# Patient Record
Sex: FEMALE | Race: WHITE | Employment: OTHER | URBAN - METROPOLITAN AREA
[De-identification: names, ages, dates, MRNs, and addresses within clinical notes are randomized per-mention and may not be internally consistent; named-entity substitution may affect disease eponyms.]

---

## 2020-11-25 ENCOUNTER — TRANSCRIBE ORDER (OUTPATIENT)
Dept: SCHEDULING | Age: 75
End: 2020-11-25

## 2020-11-25 DIAGNOSIS — M54.42 ACUTE BACK PAIN WITH SCIATICA, LEFT: ICD-10-CM

## 2020-11-25 DIAGNOSIS — M54.41 ACUTE BACK PAIN WITH SCIATICA, RIGHT: Primary | ICD-10-CM

## 2020-12-08 ENCOUNTER — HOSPITAL ENCOUNTER (OUTPATIENT)
Dept: MRI IMAGING | Age: 75
Discharge: HOME | End: 2020-12-08
Attending: FAMILY MEDICINE
Payer: MEDICARE

## 2020-12-08 DIAGNOSIS — M54.41 ACUTE BACK PAIN WITH SCIATICA, RIGHT: ICD-10-CM

## 2020-12-08 DIAGNOSIS — M54.42 ACUTE BACK PAIN WITH SCIATICA, LEFT: ICD-10-CM

## 2020-12-08 PROCEDURE — 72148 MRI LUMBAR SPINE W/O DYE: CPT

## 2021-11-05 ENCOUNTER — TELEPHONE (OUTPATIENT)
Dept: NEUROLOGY | Age: 76
End: 2021-11-05

## 2021-11-05 NOTE — TELEPHONE ENCOUNTER
----- Message from Lulu Beatty sent at 11/5/2021  9:52 AM EDT -----  Regarding: \telephone  Contact: [phone]  General Message/Vendor Calls    Caller's first and last name:self      Reason for call:just have some question about her appointment       Callback required yes/no and why:y      Best contact number(s):[phone]      Details to clarify the request:n\a      Lulu Beatty

## 2021-11-12 ENCOUNTER — OFFICE VISIT (OUTPATIENT)
Dept: NEUROLOGY | Age: 76
End: 2021-11-12
Payer: MEDICARE

## 2021-11-12 DIAGNOSIS — R20.2 PARESTHESIA: Primary | ICD-10-CM

## 2021-11-12 PROCEDURE — 95886 MUSC TEST DONE W/N TEST COMP: CPT | Performed by: PSYCHIATRY & NEUROLOGY

## 2021-11-12 PROCEDURE — 95910 NRV CNDJ TEST 7-8 STUDIES: CPT | Performed by: PSYCHIATRY & NEUROLOGY

## 2021-11-12 NOTE — LETTER
2021 4:06 PM    Patient:  Katya Curry   YOB: 1945  Date of Visit: 2021      Dear Bryanna Maurer, [address] [place] [address]  Via Fax: [phone]: Thank you for referring Ms. Katya Curry to me for EMG/NCS. EMG/ NCS Report  [facility] - INPATIENT  [place] [address] [place], [address] [place], [address]   Ph: [phone]   FAX: [phone]/ [phone]  Test Date:  2019      Test Date:  2021    Patient: Katya Curry : 1945 Physician: Natanael Orona MD   Sex: Female Height: ' \" Ref Phys: Abilio Valerio MD   ID#:  [id number] Weight:  lbs. Technician: Rodrigo Cespedes     Patient History / Exam:    Patient comes in with intermittent numbness of both feet. (+) chronic lower back pain seeing pain management Dr Xavier Tinoco. Patient is coming for radiculopathy evaluation            EMG & NCV Findings:  Evaluation of the left Fibular motor and the right Fibular motor nerves showed normal distal onset latency (L4.1, R5.1 ms), normal amplitude (L4.4, R1.2 mV), normal conduction velocity (B Fib-Ankle, L44, R41 m/s), and normal conduction velocity (Poplt-B Fib, L48, R45 m/s). The left tibial motor and the right tibial motor nerves showed normal distal onset latency (L4.4, R4.4 ms), normal amplitude (L9.8, R6.4 mV), and normal conduction velocity (Knee-Ankle, L40, R39 m/s). The left Sup Fibular sensory and the right Sup Fibular sensory nerves showed normal distal peak latency (L2.8, R2.5 ms), normal amplitude (L5.5, R4.0 µV), and normal conduction velocity (Lower leg-Lat ankle, L42, R50 m/s). The left sural sensory and the right sural sensory nerves showed normal distal peak latency (L3.9, R3.6 ms) and normal amplitude (L5.9, R4.0 µV). All F Wave latencies were within normal limits. All examined muscles (as indicated in the following table) showed no evidence of electrical instability. Impression:    Extensive electrodiagnostic examination of the left and right lower extremities is normal.    Specifically, there is no evidence of a peripheral neuropathy or lumbosacral motor radiculopathy.         Judith Dillon MD  Diplomate, American Board of Psychiatry and Neurology  Diplomate, Neuromuscular Medicine  Diplomate, American Board of Electrodiagnostic Medicine  Director, [address] [place] [address] Accredited Laboratory with Exemplary Status          Nerve Conduction Studies  Anti Sensory Summary Table     Stim Site NR Peak (ms) Norm Peak (ms) P-T Amp (µV) Norm P-T Amp Site1 Site2 Dist (cm)   Left Sup Fibular Anti Sensory (Lat ankle)  30.2°C   Lower leg    2.8 <4.6 5.5 >4 Lower leg Lat ankle 10.0   Right Sup Fibular Anti Sensory (Lat ankle)  30°C   Lower leg    2.5 <4.6 4.0 >4 Lower leg Lat ankle 10.0   Left Sural Anti Sensory (Lat Mall)  30.2°C   Calf    3.9 <4.5 5.9 >4.0 Calf Lat Mall 14.0   Right Sural Anti Sensory (Lat Mall)  31.6°C   Calf    3.6 <4.5 4.0 >4.0 Calf Lat Mall 14.0     Motor Summary Table     Stim Site NR Onset (ms) Norm Onset (ms) O-P Amp (mV) Norm O-P Amp Amp (Prev) (%) Site1 Site2 Dist (cm) Domenic (m/s) Norm Domenic (m/s)   Left Fibular Motor (Ext Dig Brev)  33.2°C   Ankle    4.1 <6.5 4.4 >1.1 100.0 Ankle Ext Dig Brev 8.0     B Fib    11.4  4.3  97.7 B Fib Ankle 32.0 44 >38   Poplt    13.5  4.1  95.3 Poplt B Fib 10.0 48 >42   Right Fibular Motor (Ext Dig Brev)  30.4°C   Ankle    5.1 <6.5 1.2 >1.1 100.0 Ankle Ext Dig Brev 8.0     B Fib    13.3  1.2  100.0 B Fib Ankle 34.0 41 >38   Poplt    15.5  1.2  100.0 Poplt B Fib 10.0 45 >42   Left Tibial Motor (Abd Garcia Brev)  30.8°C   Ankle    4.4 <6.1 9.8 >1.1 100.0 Ankle Abd Garcia Brev 8.0     Knee    14.1  5.5  56.1 Knee Ankle 39.0 40 >39   Right Tibial Motor (Abd Garcia Brev)  30.5°C   Ankle    4.4 <6.1 6.4 >1.1 100.0 Ankle Abd Garcia Brev 8.0     Knee    14.5  6.1  95.3 Knee Ankle 39.0 39 >39     F Wave Studies     NR F-Lat (ms) Lat Norm (ms) L-R F-Lat (ms) L-R Lat Norm   Right Tibial (Mrkrs) (Abd Hallucis)  30.2°C      52.54 <56  <5.7     EMG     Side Muscle Nerve Root Ins Act Fibs Psw Recrt Duration Amp Poly Comment   Right Ext Dig Brev Dp Br Peron L5, S1 Nml Nml Nml Nml Nml Nml Nml    Right AbdHallucis MedPlantar S1-2 Nml Nml Nml Nml Nml Nml Nml    Right AntTibialis Dp Br Peron L4-5 Nml Nml Nml Nml Nml Nml Nml    Right MedGastroc Tibial S1-2 Nml Nml Nml Nml Nml Nml Nml    Right VastusLat Femoral L2-4 Nml Nml Nml Nml Nml Nml Nml    Left Ext Dig Brev Dp Br Peron L5, S1 Nml Nml Nml Nml Nml Nml Nml    Left AbdHallucis MedPlantar S1-2 Nml Nml Nml Nml Nml Nml Nml    Left AntTibialis Dp Br Peron L4-5 Nml Nml Nml Nml Nml Nml Nml    Left MedGastroc Tibial S1-2 Nml Nml Nml Nml Nml Nml Nml    Left VastusLat Femoral L2-4 Nml Nml Nml Nml Nml Nml Nml    Left GluteusMed SupGluteal L4-S1 Nml Nml Nml Nml Nml Nml Nml    Left Lower Lumb Parasp Rami L5,S1 Nml Nml Nml Nml Nml Nml Nml                Nerve Conduction Studies  Anti Sensory Left/Right Comparison     Stim Site L Lat (ms) R Lat (ms) L-R Lat (ms) L Amp (µV) R Amp (µV) L-R Amp (%) Site1 Site2 L Domenic (m/s) R Domenic (m/s) L-R Domenic (m/s)   Sup Fibular Anti Sensory (Lat ankle)  30.2°C   Lower leg 2.4 2.0 0.4 5.5 4.0 27.3 Lower leg Lat ankle 42 50 8   Sural Anti Sensory (Lat Mall)  30.2°C   Calf 3.4 3.1 0.3 5.9 4.0 32.2 Calf Lat Mall 41 45 4     Motor Left/Right Comparison     Stim Site L Lat (ms) R Lat (ms) L-R Lat (ms) L Amp (mV) R Amp (mV) L-R Amp (%) Site1 Site2 L Domenic (m/s) R Domenic (m/s) L-R Domenic (m/s)   Fibular Motor (Ext Dig Brev)  33.2°C   Ankle 4.1 5.1 1.0 4.4 1.2 72.7 Ankle Ext Dig Brev      B Fib 11.4 13.3 1.9 4.3 1.2 72.1 B Fib Ankle 44 41 3   Poplt 13.5 15.5 2.0 4.1 1.2 70.7 Poplt B Fib 48 45 3   Tibial Motor (Abd Garcia Brev)  30.8°C   Ankle 4.4 4.4 0.0 9.8 6.4 34.7 Ankle Abd Garcia Brev      Knee 14.1 14.5 0.4 5.5 6.1 9.8 Knee Ankle 40 39 1         Waveforms:

## 2021-11-12 NOTE — PROCEDURES
EMG/ NCS Report  [facility] - INPATIENT  [address] [place], [address] [place], [address]   Ph: [phone]   FAX: [phone]  Test Date:  2019      Test Date:  2021    Patient: Cloivs Romero : 1945 Physician: Sophie Degroot MD   Sex: Female Height: ' \" Ref Phys: Etta Lugo MD   ID#:  [id number] Weight:  lbs. Technician: Yuly Mosley     Patient History / Exam:    Patient comes in with intermittent numbness of both feet. (+) chronic lower back pain seeing pain management Dr Shabbir Hayden. Patient is coming for radiculopathy evaluation            EMG & NCV Findings:  Evaluation of the left Fibular motor and the right Fibular motor nerves showed normal distal onset latency (L4.1, R5.1 ms), normal amplitude (L4.4, R1.2 mV), normal conduction velocity (B Fib-Ankle, L44, R41 m/s), and normal conduction velocity (Poplt-B Fib, L48, R45 m/s). The left tibial motor and the right tibial motor nerves showed normal distal onset latency (L4.4, R4.4 ms), normal amplitude (L9.8, R6.4 mV), and normal conduction velocity (Knee-Ankle, L40, R39 m/s). The left Sup Fibular sensory and the right Sup Fibular sensory nerves showed normal distal peak latency (L2.8, R2.5 ms), normal amplitude (L5.5, R4.0 µV), and normal conduction velocity (Lower leg-Lat ankle, L42, R50 m/s). The left sural sensory and the right sural sensory nerves showed normal distal peak latency (L3.9, R3.6 ms) and normal amplitude (L5.9, R4.0 µV). All F Wave latencies were within normal limits. All examined muscles (as indicated in the following table) showed no evidence of electrical instability. Impression:    Extensive electrodiagnostic examination of the left and right lower extremities is normal.    Specifically, there is no evidence of a peripheral neuropathy or lumbosacral motor radiculopathy.         Sophie Degroot MD  Diplomate, American Board of Psychiatry and Neurology  Diplomate, Neuromuscular Medicine  Diplomate, American Board of Electrodiagnostic Medicine  Director, [address] [place] [address] Accredited Laboratory with Exemplary Status          Nerve Conduction Studies  Anti Sensory Summary Table     Stim Site NR Peak (ms) Norm Peak (ms) P-T Amp (µV) Norm P-T Amp Site1 Site2 Dist (cm)   Left Sup Fibular Anti Sensory (Lat ankle)  30.2°C   Lower leg    2.8 <4.6 5.5 >4 Lower leg Lat ankle 10.0   Right Sup Fibular Anti Sensory (Lat ankle)  30°C   Lower leg    2.5 <4.6 4.0 >4 Lower leg Lat ankle 10.0   Left Sural Anti Sensory (Lat Mall)  30.2°C   Calf    3.9 <4.5 5.9 >4.0 Calf Lat Mall 14.0   Right Sural Anti Sensory (Lat Mall)  31.6°C   Calf    3.6 <4.5 4.0 >4.0 Calf Lat Mall 14.0     Motor Summary Table     Stim Site NR Onset (ms) Norm Onset (ms) O-P Amp (mV) Norm O-P Amp Amp (Prev) (%) Site1 Site2 Dist (cm) Domenic (m/s) Norm Domenic (m/s)   Left Fibular Motor (Ext Dig Brev)  33.2°C   Ankle    4.1 <6.5 4.4 >1.1 100.0 Ankle Ext Dig Brev 8.0     B Fib    11.4  4.3  97.7 B Fib Ankle 32.0 44 >38   Poplt    13.5  4.1  95.3 Poplt B Fib 10.0 48 >42   Right Fibular Motor (Ext Dig Brev)  30.4°C   Ankle    5.1 <6.5 1.2 >1.1 100.0 Ankle Ext Dig Brev 8.0     B Fib    13.3  1.2  100.0 B Fib Ankle 34.0 41 >38   Poplt    15.5  1.2  100.0 Poplt B Fib 10.0 45 >42   Left Tibial Motor (Abd Garcia Brev)  30.8°C   Ankle    4.4 <6.1 9.8 >1.1 100.0 Ankle Abd Garcia Brev 8.0     Knee    14.1  5.5  56.1 Knee Ankle 39.0 40 >39   Right Tibial Motor (Abd Garcia Brev)  30.5°C   Ankle    4.4 <6.1 6.4 >1.1 100.0 Ankle Abd Garcia Brev 8.0     Knee    14.5  6.1  95.3 Knee Ankle 39.0 39 >39     F Wave Studies     NR F-Lat (ms) Lat Norm (ms) L-R F-Lat (ms) L-R Lat Norm   Right Tibial (Mrkrs) (Abd Hallucis)  30.2°C      52.54 <56  <5.7     EMG     Side Muscle Nerve Root Ins Act Fibs Psw Recrt Duration Amp Poly Comment   Right Ext Dig Brev Dp Br Peron L5, S1 Nml Nml Nml Nml Nml Nml Nml    Right AbdHallucis MedPlantar S1-2 Nml Nml Nml Nml Nml Nml Nml    Right AntTibialis Dp Br Peron L4-5 Nml Nml Nml Nml Nml Nml Nml    Right MedGastroc Tibial S1-2 Nml Nml Nml Nml Nml Nml Nml    Right VastusLat Femoral L2-4 Nml Nml Nml Nml Nml Nml Nml    Left Ext Dig Brev Dp Br Peron L5, S1 Nml Nml Nml Nml Nml Nml Nml    Left AbdHallucis MedPlantar S1-2 Nml Nml Nml Nml Nml Nml Nml    Left AntTibialis Dp Br Peron L4-5 Nml Nml Nml Nml Nml Nml Nml    Left MedGastroc Tibial S1-2 Nml Nml Nml Nml Nml Nml Nml    Left VastusLat Femoral L2-4 Nml Nml Nml Nml Nml Nml Nml    Left GluteusMed SupGluteal L4-S1 Nml Nml Nml Nml Nml Nml Nml    Left Lower Lumb Parasp Rami L5,S1 Nml Nml Nml Nml Nml Nml Nml                Nerve Conduction Studies  Anti Sensory Left/Right Comparison     Stim Site L Lat (ms) R Lat (ms) L-R Lat (ms) L Amp (µV) R Amp (µV) L-R Amp (%) Site1 Site2 L Domenic (m/s) R Domenic (m/s) L-R Domenic (m/s)   Sup Fibular Anti Sensory (Lat ankle)  30.2°C   Lower leg 2.4 2.0 0.4 5.5 4.0 27.3 Lower leg Lat ankle 42 50 8   Sural Anti Sensory (Lat Mall)  30.2°C   Calf 3.4 3.1 0.3 5.9 4.0 32.2 Calf Lat Mall 41 45 4     Motor Left/Right Comparison     Stim Site L Lat (ms) R Lat (ms) L-R Lat (ms) L Amp (mV) R Amp (mV) L-R Amp (%) Site1 Site2 L Domenic (m/s) R Domenic (m/s) L-R Domenic (m/s)   Fibular Motor (Ext Dig Brev)  33.2°C   Ankle 4.1 5.1 1.0 4.4 1.2 72.7 Ankle Ext Dig Brev      B Fib 11.4 13.3 1.9 4.3 1.2 72.1 B Fib Ankle 44 41 3   Poplt 13.5 15.5 2.0 4.1 1.2 70.7 Poplt B Fib 48 45 3   Tibial Motor (Abd Garcia Brev)  30.8°C   Ankle 4.4 4.4 0.0 9.8 6.4 34.7 Ankle Abd Garcia Brev      Knee 14.1 14.5 0.4 5.5 6.1 9.8 Knee Ankle 40 39 1         Waveforms: